# Patient Record
(demographics unavailable — no encounter records)

---

## 2024-10-17 NOTE — END OF VISIT
[FreeTextEntry3] : All medical record entries made by the ohibe were at my, CHAPIS GARDNER, direction and personally dictated by me on 10/17/2024. I have reviewed the chart and agree that the record accurately reflects my personal performance of the history, physical exam, assessment, and plan. I have also personally directed, reviewed, and agreed with the chart.

## 2024-10-17 NOTE — PHYSICAL EXAM
complains of pain/discomfort [Normal Breath Sounds] : Normal breath sounds [2+] : left 2+ [Alert] : alert [Oriented to Person] : oriented to person [Oriented to Place] : oriented to place [Calm] : calm [JVD] : no jugular venous distention  [Ankle Swelling (On Exam)] : not present [Varicose Veins Of Lower Extremities] : not present [] : not present [de-identified] : NAD [de-identified] : NCAT [de-identified] : supple [de-identified] : soft, NT, ND; no tender masses [de-identified] :  No gross motor or sensory deficits. [de-identified] : Appropriate

## 2024-10-17 NOTE — ASSESSMENT
[FreeTextEntry1] : 75-year-old female with an incidental finding of 3.2 cm infrarenal abdominal aortic aneurysm, which was discovered on a pelvic CT status post fall.  She was also found to have an L2 fracture for which she is under evaluation and treatment. She is asymptomatic from her aneurysm.  We discussed the natural history of AAA, as well as thresholds for treatment which include symptomatic status or size near 5.5 cm.  She does not meet indications for treatment currently, however we will perform surveillance.  We will obtain a CTA for baseline assessment of her anatomy and size.  She will follow up with me in 3 months time, at which point we will develop a surveillance regimen. I strongly advised smoking cessation, as this is clearly as risk factor for growth.  She will continue risk factor modification.  The patient returns for possible poor circulation. I did discuss with her that she has a normal pulse exam, and no evidence of macrovascular arterial disease.    She is asymptomatic from her aneurysm.  We will obtain a CTA for baseline assessment of her anatomy and size.  She will follow up with me in 4 weeks, at which point we will develop a surveillance regimen. I strongly advised smoking cessation, as this is clearly as risk factor for growth.  She will continue risk factor modification.  On follow up, patient is doing well. She  remains asymptomatic in regards to her AAA. Patient underwent a CTA of the abdomen/pelvis which demonstrated 4.8 cm AAA. The aneurysm has increased. We discussed the natural history of AAA, as well as thresholds for treatment which include symptomatic status or size near 5 cm for woman. We also discussed additional surgical treatment options such as endovascular repair if the aneurysm reaches 5cm. We will closely monitor the aneurysm at this point. She will follow up with me in 3 months, where an US will be obtained as well.  Continue smoking cessation.

## 2024-10-17 NOTE — ADDENDUM
[FreeTextEntry1] :  Documented by Jaimee Cuevas acting as a scribe for CHAPIS COPPOLA on 10/17/2024.

## 2024-10-17 NOTE — HISTORY OF PRESENT ILLNESS
[FreeTextEntry1] : 72-year-old female referred by Dr. Quick for evaluation of an incidental finding of an abdominal aortic aneurysm.\par  Approximately 4 weeks ago, she was seen in the [facility] ER after mechanical fall.  She tripped over her dog and fell on her right side and back, resulting in right lower back pain and hip pain.  She was evaluated in the ER and underwent a CAT scan, which revealed an L2 compression fracture as well as an incidental finding of a 3.2 cm abdominal aortic aneurysm.\par  She denies abdominal pain or groin pain.  She denies a personal or family history of aneurysmal disease or connective tissue disorder.\par  She has a history of GERD, asthma, hypertension.  She is a longtime smoker, has been smoking for approximately 50 years, 1 to 2 packs/day. [de-identified] : 74 yo female known to have 3.2 cm abdominal aortic aneurysm. The patient was supposed to obtain an CTA  of the abdomen/pelvis and follow up after, however, was to lost to follow up. The patient was referred by Dr. Leos for possible blood circulation. She denies pain with ambulation. She does have difficulty walking and is need of assistance. The patient continues to smoke. She is here to discuss additional treatment options.  She is accompanied by her sister-in-law.  10/17/24- Patient returns in follow up. She is known to have a 3.2 cm AAA. Patient denies any abdominal pain. She denies rest pain. She underwent an CTA of the abdomen/pelvis. She is here to discuss the results and additional treatment options. Patient has quit smoking.

## 2024-11-04 NOTE — HISTORY OF PRESENT ILLNESS
[FreeTextEntry1] : Patient has comfortable breathing. Appetite has improved. Has gained 13 lbs. Abdominal CT 4.8 cm aneurysm. Cardiac CTA demonstrated patent stents and nonobstructive stenosis. Lung nodule has been found. Patient declined pulmonary referral. The abnormality was discussed with the patient while family member present. She again declined lung referral.

## 2024-11-04 NOTE — REASON FOR VISIT
[Cardiac Failure] : cardiac failure [Coronary Artery Disease] : coronary artery disease [Carotid, Aortic and Peripheral Vascular Disease] : carotid, aortic and peripheral vascular disease

## 2024-11-04 NOTE — PHYSICAL EXAM
[Normal Venous Pressure] : normal venous pressure [Normal S1, S2] : normal S1, S2 [Murmur] : murmur [Soft] : abdomen soft [No Edema] : no edema [No Focal Deficits] : no focal deficits [de-identified] : comfortable [de-identified] : regular rhythm, rate 52 [de-identified] : quiet breath sounds, no rales or wheezes,

## 2025-02-14 NOTE — HISTORY OF PRESENT ILLNESS
[FreeTextEntry1] : Patient stated she has periods of dizziness. No chest pain or rest SOB. Taking medications as prescribed. Family stated that there are periods of confusion. Had headache for 1 day.

## 2025-02-14 NOTE — PHYSICAL EXAM
[Normal Sclera/Conjunctiva] : normal sclera/conjunctiva [Normal Outer Ear/Nose] : the outer ears and nose were normal in appearance [Normal] : normal rate, regular rhythm, normal S1 and S2 and no murmur heard [de-identified] : notes feels as though can feel R side a little more than L when both sides of face are touched; also with R side unable to complete finger to nose -at times over shooting. slow gait with walker -doesn't appear to favor one leg over the other

## 2025-02-14 NOTE — ASSESSMENT
[FreeTextEntry1] : -------------------------------------------------------------------- ECG - NSR, rate 78, axis +20, old inferior MI, no change Continue current meds Labs today Obtain brain CT RTO 2-3 mos or PRN

## 2025-02-14 NOTE — PHYSICAL EXAM
[No Acute Distress] : no acute distress [Normal Venous Pressure] : normal venous pressure [Normal S1, S2] : normal S1, S2 [Murmur] : murmur [Soft] : abdomen soft [No Edema] : no edema [No Focal Deficits] : no focal deficits [Memory Deficit] : memory deficit [Well Developed] : well developed [Clear Lung Fields] : clear lung fields [de-identified] : alert, responsive [de-identified] : regular rhythm,  [de-identified] : ,

## 2025-02-14 NOTE — HISTORY OF PRESENT ILLNESS
[FreeTextEntry8] : 75 yo F presenting to clinic for acute visit. Pt is new to provider today- follows with Dr. Bowser as PCP.   Here with daughter in law who was called by pt's son on Thursday. Family recalls that Wednesday suddenly with loss of memory and some confusion which lasted for some time but she recovered. At that time had some trouble remembering how to make coffee.  This am briefly forgot her last name but quickly came to  Also feeling dizzy/lighted happening randomly  - uses walker for balance when out of home  - feels as though her feet are a bit unsteady when she walks- leading ot the dizzy/lightheaded feeling- denies room spinning  -No prior stroke hx but does have hx of MI s/p stent placement  -Denies any pain, issues with urination, fever/chills

## 2025-02-27 NOTE — ASSESSMENT
[FreeTextEntry1] : 76-year-old female with an incidental finding of 3.2 cm infrarenal abdominal aortic aneurysm, which was discovered on a pelvic CT status post fall.  She was also found to have an L2 fracture for which she is under evaluation and treatment. She is asymptomatic from her aneurysm.  We discussed the natural history of AAA, as well as thresholds for treatment which include symptomatic status or size near 5.5 cm.  She does not meet indications for treatment currently, however we will perform surveillance.  We will obtain a CTA for baseline assessment of her anatomy and size.  She will follow up with me in 3 months time, at which point we will develop a surveillance regimen. I strongly advised smoking cessation, as this is clearly as risk factor for growth.  She will continue risk factor modification.  The patient returns for possible poor circulation. I did discuss with her that she has a normal pulse exam, and no evidence of macrovascular arterial disease.    She is asymptomatic from her aneurysm.  We will obtain a CTA for baseline assessment of her anatomy and size.  She will follow up with me in 4 weeks, at which point we will develop a surveillance regimen. I strongly advised smoking cessation, as this is clearly as risk factor for growth.  She will continue risk factor modification.  On follow up, patient is doing well. She remains asymptomatic in regards to her AAA. Patient underwent a CTA of the abdomen/pelvis which demonstrated 4.8 cm AAA. The aneurysm has increased. We discussed the natural history of AAA, as well as thresholds for treatment which include symptomatic status or size near 5 cm for woman. We also discussed additional surgical treatment options such as endovascular repair if the aneurysm reaches 5cm. We will closely monitor the aneurysm at this point. She will follow up with me in 3 months, where an US will be obtained as well.  Continue smoking cessation.  On follow up, she continues to remain asymptomatic in regards to her AAA. Patient underwent an aortoiliac duplex which demonstrated 4.5 cm AAA. The aneurysm has decreased in size since prior visit. Patient is well aware that if she has a symptomatic status or size near 5 cm for woman then she is need of surgical intervention. We also discussed additional surgical treatment options such as endovascular repair if the aneurysm reaches 5 cm. We will continue to monitor the aneurysm at this point. She will follow up with me in 6 months, where an US will be obtained as well. We will discuss the results after the duplex is performed. Continue smoking cessation.

## 2025-02-27 NOTE — END OF VISIT
[FreeTextEntry3] : All medical record entries made by the ohibe were at my, CHAPIS GARDNER, direction and personally dictated by me on 2/27/2025. I have reviewed the chart and agree that the record accurately reflects my personal performance of the history, physical exam, assessment, and plan. I have also personally directed, reviewed, and agreed with the chart.

## 2025-02-27 NOTE — PHYSICAL EXAM
[Normal Breath Sounds] : Normal breath sounds [2+] : left 2+ [Alert] : alert [Oriented to Person] : oriented to person [Oriented to Place] : oriented to place [Calm] : calm [JVD] : no jugular venous distention  [Ankle Swelling (On Exam)] : not present [Varicose Veins Of Lower Extremities] : not present [] : not present [de-identified] : NAD [de-identified] : NCAT [de-identified] : supple [de-identified] : soft, NT, ND; no tender masses [de-identified] :  No gross motor or sensory deficits. [de-identified] : Appropriate

## 2025-02-27 NOTE — HISTORY OF PRESENT ILLNESS
[FreeTextEntry1] : 72-year-old female referred by Dr. Quick for evaluation of an incidental finding of an abdominal aortic aneurysm.\par  Approximately 4 weeks ago, she was seen in the [facility] ER after mechanical fall.  She tripped over her dog and fell on her right side and back, resulting in right lower back pain and hip pain.  She was evaluated in the ER and underwent a CAT scan, which revealed an L2 compression fracture as well as an incidental finding of a 3.2 cm abdominal aortic aneurysm.\par  She denies abdominal pain or groin pain.  She denies a personal or family history of aneurysmal disease or connective tissue disorder.\par  She has a history of GERD, asthma, hypertension.  She is a longtime smoker, has been smoking for approximately 50 years, 1 to 2 packs/day. [de-identified] : 76 yo female known to have 3.2 cm abdominal aortic aneurysm. The patient was supposed to obtain an CTA  of the abdomen/pelvis and follow up after, however, was to lost to follow up. The patient was referred by Dr. Leos for possible blood circulation. She denies pain with ambulation. She does have difficulty walking and is need of assistance. The patient continues to smoke. She is here to discuss additional treatment options.  She is accompanied by her sister-in-law.  10/17/24- Patient returns in follow up. She is known to have a 3.2 cm AAA. Patient denies any abdominal pain. She denies rest pain. She underwent an CTA of the abdomen/pelvis. She is here to discuss the results and additional treatment options. Patient has quit smoking.  2/27/25 - 76 year old female returns in follow up. She has a known 4.8 cm AAA. She denies any abdominal pain. She reports since her last visit, she was recently hospitalized last week for altered mental status. She underwent multiple MRIs of the brain, which was negative for acute stroke. She was also diagnosed with UTI at her time in the hospital. Patient has been following up with her PMD. Patient continues smoking cessation.

## 2025-03-10 NOTE — HISTORY OF PRESENT ILLNESS
[Post-hospitalization from ___ Hospital] : Post-hospitalization from [unfilled] Hospital [Admitted on: ___] : The patient was admitted on [unfilled] [Discharged on ___] : discharged on [unfilled] [Discharge Summary] : discharge summary [FreeTextEntry2] : Patient is a 76-year-old female presents to clinic today for hospital follow-up after being admitted to [facility] for further evaluation and treatment of sepsis.  Patient diagnosed with choledocholithiasis with acute cholecystitis however was unable to undergo procedure for gallbladder removal. Patient was discharged on Augmentin-now finished Patient also started on Eliquis and continued on aspirin as well as metoprolol and pantoprazole. Patient was however advised to stop taking clopidogrel, gabapentin, hydralazine and prednisone.  Pt here with daughter in law- notes that pt accidentally used Fixodent in groin area and since then with rash  Pt notes that today pt feels well and much better than prior

## 2025-03-10 NOTE — PHYSICAL EXAM
[Normal] : no acute distress, well nourished, well developed and well-appearing [Normal Sclera/Conjunctiva] : normal sclera/conjunctiva [Normal Outer Ear/Nose] : the outer ears and nose were normal in appearance [No Respiratory Distress] : no respiratory distress  [de-identified] : Groin area between thighs noted erythematous, non-tender rash  radha in thigh folds

## 2025-04-22 NOTE — HISTORY OF PRESENT ILLNESS
[Home] : at home, [unfilled] , at the time of the visit. [Medical Office: ([facility])___] : at the medical office located in  [Telehealth (audio & video)] : This visit was provided via telehealth using real-time 2-way audio visual technology. [Verbal consent obtained from patient] : the patient, [unfilled] [FreeTextEntry1] : HHA evaluation  [de-identified] : Pt is a 77 yo F presenting to clinic via VV to speak to pt and pr's son in regards to HHA reinstitution.  Pt previously received Home Health Aid five days a week for 4 hrs (8am-12pm). However has not had since Friday 4/18 - Peripheral vision totally gone  -Still using walker for ambulation but still a bit unsteady on her feet  -Can feed herself however needs help with food prep and plating of food  as well  -Unable to dress herself. Needs help putting in dentures as well -Can go to the bathroom herself but has issues with wiping herself clean . Also needs bathing help. -Can transfer from chair to bed or on to the toilet herself with some assistance   Unable to perform any IADLs

## 2025-06-18 NOTE — PHYSICAL EXAM
[Well Developed] : well developed [No Acute Distress] : no acute distress [Normal Venous Pressure] : normal venous pressure [Normal S1, S2] : normal S1, S2 [Murmur] : murmur [Soft] : abdomen soft [No Edema] : no edema [No Focal Deficits] : no focal deficits [Memory Deficit] : memory deficit [de-identified] : Pleasant, appropriate [de-identified] : regular rhythm,  [de-identified] : ,Quiet breath sounds no rales or wheezing

## 2025-06-18 NOTE — HISTORY OF PRESENT ILLNESS
[FreeTextEntry1] : Patient is comfortable. No chest pain, palpitations or SOB at rest. Admits to LUNA. Admitted to [facility] with gallstones. Records reviewed. Meds reconciled. Had ERCP and stent. No surgery. Meds adjusted. Follows with vascular Dr Whitfield for AAA.

## 2025-07-10 NOTE — ASSESSMENT
[FreeTextEntry1] : Reviewed outside medical records including CT and MRI reports, clinical notes and labs- BMP

## 2025-07-10 NOTE — HISTORY OF PRESENT ILLNESS
[Home] : at home, [unfilled] , at the time of the visit. [Medical Office: ([facility])___] : at the medical office located in  [Telehealth (audio & video)] : This visit was provided via telehealth using real-time 2-way audio visual technology. [Verbal consent obtained from patient] : the patient, [unfilled] [FreeTextEntry1] : Follow up  [de-identified] : Patient is a 76-year-old female history of CAD, CHF, COPD, HTN, PAD, memory impairment presenting to clinic via virtual visit for follow-up.  Spoke to the patient as well as patient's son Yesterday patient went for procedure to have stent from gallbladder removed.  Since then has been vomiting multiple times-all foodstuffs.  Yesterday tried eating peanut butter and jelly sandwich however promptly threw it up was unable also to hold down any pills yesterday.  Vomiting seemed to slow down early this morning.  And for breakfast had a banana and bagel with cream cheese which so far she has been able to hold down-however patient does note that she is starting to feel a little un-easy but not specifically nauseous  Has been following up with neurologist who increased memantine to 10mg  Reviewed previous records from prior hospitalizations noted incidental finding of renal cyst and renal ultrasound was advised son notes however was unaware of this.